# Patient Record
Sex: MALE | Race: OTHER | HISPANIC OR LATINO | URBAN - METROPOLITAN AREA
[De-identification: names, ages, dates, MRNs, and addresses within clinical notes are randomized per-mention and may not be internally consistent; named-entity substitution may affect disease eponyms.]

---

## 2023-05-13 ENCOUNTER — EMERGENCY (EMERGENCY)
Facility: HOSPITAL | Age: 58
LOS: 1 days | Discharge: HOME | End: 2023-05-13
Admitting: EMERGENCY MEDICINE
Payer: COMMERCIAL

## 2023-05-13 VITALS
HEIGHT: 69.69 IN | RESPIRATION RATE: 18 BRPM | OXYGEN SATURATION: 95 % | HEART RATE: 91 BPM | WEIGHT: 225.09 LBS | DIASTOLIC BLOOD PRESSURE: 95 MMHG | TEMPERATURE: 98 F | SYSTOLIC BLOOD PRESSURE: 138 MMHG

## 2023-05-13 PROCEDURE — 99282 EMERGENCY DEPT VISIT SF MDM: CPT | Mod: 25

## 2023-05-13 PROCEDURE — 99283 EMERGENCY DEPT VISIT LOW MDM: CPT

## 2023-05-13 PROCEDURE — 69200 CLEAR OUTER EAR CANAL: CPT | Mod: RT

## 2023-05-13 NOTE — ED ADULT TRIAGE NOTE - WEIGHT IN KG
Patient's daughter, Sandhya, is calling about patient's leg pain in his right leg. She states that he has been having severe pain in his right leg, mostly in his lower leg but occasionally up in his thigh. The pain has been severe enough for him to have difficulty walking. Patient's daughter is concerned about his compliance and his symptoms. Sandhya can be reached at [phone]   102.1

## 2023-05-13 NOTE — ED ADULT NURSE NOTE - OBJECTIVE STATEMENT
Pt to ED for cotton from QTip lodged in ear canal since this morning. Endorsing mild pain, muffled hearing. No bleeding. Pt denies other complaints. VSS. A&ox4. Ambulatory with steady gait.

## 2023-05-13 NOTE — ED PROCEDURE NOTE - CPROC ED FOREIGN BODY DETAIL1
cotton removed from right ear canal with alligator foreceps, no bleeding, TM intact s/p removal/The area was draped and prepped and the anatomic location of the suspected foreign body was explored in a bloodless field.

## 2023-05-13 NOTE — ED PROVIDER NOTE - CLINICAL SUMMARY MEDICAL DECISION MAKING FREE TEXT BOX
56 y/o m presents with cotton foreign body in right ear canal; removed with alligator foreceps, no bleeding after procedure, TM intact

## 2023-05-13 NOTE — ED ADULT NURSE NOTE - NSFALLUNIVINTERV_ED_ALL_ED
Bed/Stretcher in lowest position, wheels locked, appropriate side rails in place/Call bell, personal items and telephone in reach/Instruct patient to call for assistance before getting out of bed/chair/stretcher/Non-slip footwear applied when patient is off stretcher/[place] to call system/Physically safe environment - no spills, clutter or unnecessary equipment/Purposeful proactive rounding/Room/bathroom lighting operational, light cord in reach

## 2023-05-13 NOTE — ED PROVIDER NOTE - PATIENT PORTAL LINK FT
You can access the FollowMyHealth Patient Portal offered by [facility] by registering at the following website: [email or web address]. By joining [employer]’s FollowMyHealth portal, you will also be able to view your health information using other applications (apps) compatible with our system.

## 2023-05-14 DIAGNOSIS — T16.1XXA FOREIGN BODY IN RIGHT EAR, INITIAL ENCOUNTER: ICD-10-CM
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
## 2025-01-31 NOTE — ED ADULT NURSE NOTE - CAS EDP DISCH TYPE
Transportation Confirmation     Patient/Staff notified [facility] of transportation needs for future appointment(s).     Ride(s) arranged for the appointment date(s) below. Patient is scheduled to arrive prior to their first appointment time and will receive a message to coordinate will call transportation back once their visit is complete.      Date(s):2/3   Transportation Service: Roundtrip   Should appointments be added, altered, or canceled please notify  [facility] so adjustments can be made or additional transports arranged.   *Please see below for RoundTrip receipt confirmation*         
Home